# Patient Record
Sex: FEMALE | Race: BLACK OR AFRICAN AMERICAN
[De-identification: names, ages, dates, MRNs, and addresses within clinical notes are randomized per-mention and may not be internally consistent; named-entity substitution may affect disease eponyms.]

---

## 2020-10-13 VITALS — DIASTOLIC BLOOD PRESSURE: 70 MMHG | SYSTOLIC BLOOD PRESSURE: 109 MMHG

## 2021-08-14 ENCOUNTER — HOSPITAL ENCOUNTER (OUTPATIENT)
Dept: HOSPITAL 61 - 3 SO LND | Age: 31
Setting detail: OBSERVATION
Discharge: HOME | End: 2021-08-14
Attending: OBSTETRICS & GYNECOLOGY | Admitting: OBSTETRICS & GYNECOLOGY
Payer: SELF-PAY

## 2021-08-14 VITALS — HEIGHT: 66 IN | BODY MASS INDEX: 35.29 KG/M2 | WEIGHT: 219.58 LBS

## 2021-08-14 DIAGNOSIS — O36.8130: ICD-10-CM

## 2021-08-14 DIAGNOSIS — O30.003: Primary | ICD-10-CM

## 2021-08-14 DIAGNOSIS — Z3A.33: ICD-10-CM

## 2021-08-14 LAB
AMPHETAMINE/METHAMPHETAMINE: (no result)
APTT PPP: (no result) S
BACTERIA #/AREA URNS HPF: (no result) /HPF
BARBITURATES UR-MCNC: (no result) UG/ML
BENZODIAZ UR-MCNC: (no result) UG/L
BILIRUB UR QL STRIP: (no result)
CANNABINOIDS UR-MCNC: (no result) UG/L
COCAINE UR-MCNC: (no result) NG/ML
FIBRINOGEN PPP-MCNC: CLEAR MG/DL
METHADONE SERPL-MCNC: (no result) NG/ML
NITRITE UR QL STRIP: NEGATIVE
OPIATES UR-MCNC: (no result) NG/ML
PCP SERPL-MCNC: (no result) MG/DL
PH UR STRIP: 6 [PH]
PROT UR STRIP-MCNC: NEGATIVE MG/DL
RBC #/AREA URNS HPF: (no result) /HPF (ref 0–2)
UROBILINOGEN UR-MCNC: 1 MG/DL
WBC #/AREA URNS HPF: (no result) /HPF (ref 0–4)

## 2021-08-14 PROCEDURE — 80307 DRUG TEST PRSMV CHEM ANLYZR: CPT

## 2021-08-14 PROCEDURE — G0378 HOSPITAL OBSERVATION PER HR: HCPCS

## 2021-08-14 PROCEDURE — 81001 URINALYSIS AUTO W/SCOPE: CPT

## 2021-08-14 PROCEDURE — G0379 DIRECT REFER HOSPITAL OBSERV: HCPCS

## 2021-08-14 PROCEDURE — 59025 FETAL NON-STRESS TEST: CPT

## 2021-08-16 ENCOUNTER — HOSPITAL ENCOUNTER (EMERGENCY)
Dept: HOSPITAL 61 - ER | Age: 31
Discharge: HOME | End: 2021-08-16
Payer: MEDICAID

## 2021-08-16 VITALS — BODY MASS INDEX: 35.43 KG/M2 | WEIGHT: 220.46 LBS | HEIGHT: 66 IN

## 2021-08-16 VITALS — SYSTOLIC BLOOD PRESSURE: 100 MMHG | DIASTOLIC BLOOD PRESSURE: 57 MMHG

## 2021-08-16 DIAGNOSIS — Z3A.33: ICD-10-CM

## 2021-08-16 DIAGNOSIS — U07.1: ICD-10-CM

## 2021-08-16 DIAGNOSIS — R51.9: ICD-10-CM

## 2021-08-16 DIAGNOSIS — O99.333: ICD-10-CM

## 2021-08-16 DIAGNOSIS — J45.909: ICD-10-CM

## 2021-08-16 DIAGNOSIS — R53.83: ICD-10-CM

## 2021-08-16 DIAGNOSIS — Z88.5: ICD-10-CM

## 2021-08-16 DIAGNOSIS — O98.513: Primary | ICD-10-CM

## 2021-08-16 DIAGNOSIS — M79.10: ICD-10-CM

## 2021-08-16 DIAGNOSIS — O99.513: ICD-10-CM

## 2021-08-16 LAB
ALBUMIN SERPL-MCNC: 2.1 G/DL (ref 3.4–5)
ALBUMIN/GLOB SERPL: 0.4 {RATIO} (ref 1–1.7)
ALP SERPL-CCNC: 255 U/L (ref 46–116)
ALT SERPL-CCNC: 48 U/L (ref 14–59)
ANION GAP SERPL CALC-SCNC: 11 MMOL/L (ref 6–14)
APTT PPP: (no result) S
AST SERPL-CCNC: 31 U/L (ref 15–37)
BACTERIA #/AREA URNS HPF: (no result) /HPF
BASOPHILS # BLD AUTO: 0.1 X10^3/UL (ref 0–0.2)
BASOPHILS NFR BLD: 1 % (ref 0–3)
BILIRUB SERPL-MCNC: 0.3 MG/DL (ref 0.2–1)
BILIRUB UR QL STRIP: NEGATIVE
BUN SERPL-MCNC: 6 MG/DL (ref 7–20)
BUN/CREAT SERPL: 9 (ref 6–20)
CALCIUM SERPL-MCNC: 8.4 MG/DL (ref 8.5–10.1)
CHLORIDE SERPL-SCNC: 104 MMOL/L (ref 98–107)
CO2 SERPL-SCNC: 23 MMOL/L (ref 21–32)
CREAT SERPL-MCNC: 0.7 MG/DL (ref 0.6–1)
EOSINOPHIL NFR BLD: 0.1 X10^3/UL (ref 0–0.7)
EOSINOPHIL NFR BLD: 1 % (ref 0–3)
ERYTHROCYTE [DISTWIDTH] IN BLOOD BY AUTOMATED COUNT: 16.1 % (ref 11.5–14.5)
FIBRINOGEN PPP-MCNC: CLEAR MG/DL
GFR SERPLBLD BASED ON 1.73 SQ M-ARVRAT: 118.9 ML/MIN
GLUCOSE SERPL-MCNC: 95 MG/DL (ref 70–99)
HCT VFR BLD CALC: 30.4 % (ref 36–47)
HGB BLD-MCNC: 10.1 G/DL (ref 12–15.5)
LYMPHOCYTES # BLD: 1.7 X10^3/UL (ref 1–4.8)
LYMPHOCYTES NFR BLD AUTO: 17 % (ref 24–48)
MCH RBC QN AUTO: 26 PG (ref 25–35)
MCHC RBC AUTO-ENTMCNC: 33 G/DL (ref 31–37)
MCV RBC AUTO: 78 FL (ref 79–100)
MONO #: 1.5 X10^3/UL (ref 0–1.1)
MONOCYTES NFR BLD: 14 % (ref 0–9)
NEUT #: 6.8 X10^3/UL (ref 1.8–7.7)
NEUTROPHILS NFR BLD AUTO: 67 % (ref 31–73)
NITRITE UR QL STRIP: NEGATIVE
PH UR STRIP: 7.5 [PH]
PLATELET # BLD AUTO: 402 X10^3/UL (ref 140–400)
POTASSIUM SERPL-SCNC: 4.2 MMOL/L (ref 3.5–5.1)
PROT SERPL-MCNC: 7 G/DL (ref 6.4–8.2)
PROT UR STRIP-MCNC: NEGATIVE MG/DL
RBC # BLD AUTO: 3.89 X10^6/UL (ref 3.5–5.4)
RBC #/AREA URNS HPF: 0 /HPF (ref 0–2)
SODIUM SERPL-SCNC: 138 MMOL/L (ref 136–145)
UROBILINOGEN UR-MCNC: 1 MG/DL
WBC # BLD AUTO: 10.1 X10^3/UL (ref 4–11)
WBC #/AREA URNS HPF: (no result) /HPF (ref 0–4)

## 2021-08-16 PROCEDURE — 80053 COMPREHEN METABOLIC PANEL: CPT

## 2021-08-16 PROCEDURE — 87426 SARSCOV CORONAVIRUS AG IA: CPT

## 2021-08-16 PROCEDURE — 96360 HYDRATION IV INFUSION INIT: CPT

## 2021-08-16 PROCEDURE — 36415 COLL VENOUS BLD VENIPUNCTURE: CPT

## 2021-08-16 PROCEDURE — 87086 URINE CULTURE/COLONY COUNT: CPT

## 2021-08-16 PROCEDURE — 99283 EMERGENCY DEPT VISIT LOW MDM: CPT

## 2021-08-16 PROCEDURE — 81001 URINALYSIS AUTO W/SCOPE: CPT

## 2021-08-16 PROCEDURE — 85025 COMPLETE CBC W/AUTO DIFF WBC: CPT

## 2021-08-16 NOTE — PHYS DOC
Past Medical History


Past Medical History:  No Pertinent History, Asthma, Other


Additional Past Medical Histor:  ectopic


 (WALDEMAR ROJAS APRN)


Past Surgical History:  Tonsillectomy


Additional Past Surgical Histo:  ectopic pregnancy


 (WALDEMAR ROJAS APRN)


Smoking Status:  Current Every Day Smoker


Alcohol Use:  None


Drug Use:  Marijuana


 (WALDEMAR ROJAS APRN)





General Adult


EDM:


Chief Complaint:  FLU SYMPTOM





HPI:


HPI:





Patient is a 31  year old female who presents with yesterday began having hea

dache, body aches, shortness of breath and cough.  She is 33 weeks pregnant and 

has not had any OB care.  She has a history of smoking, ectopic pregnancy and 

asthma.  She denies chest pain, dizziness, syncope, abdominal pain, vaginal 

bleeding, abnormal vaginal discharge, back pain, diarrhea, urinary symptoms.  

She rates her overall discomfort a 7 out of 10.


 (WALDEMAR ROJAS APRN)





Review of Systems:


Review of Systems:


Constitutional:   Denies fever or +chills. []


Eyes:   Denies change in visual acuity. []


HENT:   Denies nasal congestion or sore throat. [] 


Respiratory: + cough or +shortness of breath. [] 


Cardiovascular:   Denies chest pain or edema. [] 


GI:   Denies abdominal pain, nausea, vomiting, bloody stools or diarrhea. [] 


:  Denies dysuria. [] 


Musculoskeletal:   Denies back pain or joint pain. + Generalized body aches [] 


Integument:   Denies rash. [] 


Neurologic:   +headache, denies focal weakness or sensory changes. [] 


Endocrine:   Denies polyuria or polydipsia. [] 


Lymphatic:  Denies swollen glands. [] 


Psychiatric:  Denies depression or anxiety. []


 (WALDEMAR ROJAS APRN)





Heart Score:


C/O Chest Pain:  No


Risk Factors:


Risk Factors:  DM, Current or recent (<one month) smoker, HTN, HLP, family 

history of CAD, obesity.


Risk Scores:


Score 0 - 3:  2.5% MACE over next 6 weeks - Discharge Home


Score 4 - 6:  20.3% MACE over next 6 weeks - Admit for Clinical Observation


Score 7 - 10:  72.7% MACE over next 6 weeks - Early Invasive Strategies


 (WALDEMAR ROJAS APRN)





Allergies:


Allergies:





Allergies








Coded Allergies Type Severity Reaction Last Updated Verified


 


  morphine Allergy Intermediate  8/14/21 Yes








 (WALDEMAR ROJAS)





Physical Exam:


PE:





Constitutional: Well developed, well nourished, no acute distress, non-toxic 

appearance. []


HENT: Normocephalic, atraumatic, bilateral external ears normal, oropharynx 

moist, no oral exudates, nose normal. []


Eyes: PERRLA, EOMI, conjunctiva normal, no discharge. [] 


Neck: Normal range of motion, no tenderness, supple, no stridor. [] 


Cardiovascular:Heart rate regular rhythm, no murmur []


Lungs & Thorax:  Bilateral upper breath sounds clear lower diminished to 

auscultation []


Abdomen: Bowel sounds normal, soft, no tenderness, no masses, no pulsatile 

masses. [] 


Skin: Warm, dry, no erythema, no rash. [] 


Back: No tenderness, no CVA tenderness. [] 


Extremities: No tenderness, no cyanosis, no clubbing, ROM intact, no edema. [] 


Neurologic: Alert and oriented X 3, normal motor function, normal sensory 

function, no focal deficits noted. []


Psychologic: Affect normal, judgement normal, mood normal. []


 (WALDEMAR ROJAS)





Current Patient Data:


Vital Signs:





                                   Vital Signs








  Date Time  Temp Pulse Resp B/P (MAP) Pulse Ox O2 Delivery O2 Flow Rate FiO2


 


8/16/21 14:13 98.3 117 20 108/67 (83) 99 Room Air  





 98.3       








 (WALDEMAR ROJAS)





EKG:


EKG:


[]


 (WALDEMAR ROJAS)





Radiology/Procedures:


Radiology/Procedures:


[]


 (WALDEMAR ROJAS)





Course & Med Decision Making:


Course & Med Decision Making


Pertinent Labs and Imaging studies reviewed. (See chart for details)





COVID-19 CRITERIA:    The patient was evaluated during the global COVID-19 pan

demic, and that diagnosis was suspected/considered upon their initial 

presentation.  Their evaluation, treatment and testing was consistent with 

current guidelines for patients who present with complaints or symptoms that may

 be related to COVID-19.





See HPI.  Alert and oriented x4.  Ambulatory steady gait.  Speaks in full clear 

sentences.  Lungs are clear in upper lobes diminished in lower lobes.  Vital 

signs within normal limits.  Afebrile.  She states she is not taking any kind of

 medication to help her symptoms started yesterday.  Skin pink warm and dry. 

Covid positive. OB is down to monitor fetal heart tones. Patient has tolerated 

PO intake and ate a meal. She recieved a 1 liter bolus in the ED. She will be 

sent home on Keflex and a pro air inhaler incase she needs it. Patient is not 

requiring oxygen. 





1900: Waiting for OB to come down and monitor fetal heart tones in the patient 

and then she will be discharged.  Patient is signed over to Dr. Landers.





[]


 (WALDEMAR ROJAS)


Course & Med Decision Making


patient seen by MAYA, agree with starting workup. I ended my shift at 1800 and 

thus was not present for fetal heart tones or end of encounter.





Maki Barton DO


 (MAKI BARTON DO)


Misty Disclaimer:


Dragon Disclaimer:


This electronic medical record was generated, in whole or in part, using a voice

 recognition dictation system.


 (WALDEMAR ROJAS)





COVID-19 Patient Risks:


Age 65 or older:  No


Sign of co-morbidity:  Yes


Exp to person + for COVID:  No


Exp to PUI:  No


Travel from affected area:  No


Lower respiratory symptoms:  Yes


Fever:  Yes


Other:  Yes (body aches)


 (WALDEMAR ROJAS)





PPE Use:


Full PPE with N95 mask or PAPR:  Yes


 (WALDEMAR ROJAS)





Departure


Departure


Impression:  


   Primary Impression:  


   COVID-19


   Additional Impressions:  


   Cough


   Fatigue


   Qualified Codes:  R53.83 - Other fatigue


   Body aches


Disposition:  01 HOME / SELF CARE / HOMELESS


Condition:  STABLE


Referrals:  


NO PCP (PCP)


Patient Instructions:  ABCs of Pregnancy, Cough, Adult, Fatigue





Additional Instructions:  


Follow-up with an OB doctor as appropriate.  If you begin having severe 

shortness of breath or chest pain or abdominal pain or vaginal bleeding to 

return to the emergency room.  Return emergency room if you cannot keep down any

 kind of fluids.  Take Tylenol for any kind of pain you may have or fever.  

Remember to drink plenty of water to stay hydrated.


Scripts


Cephalexin (CEPHALEXIN) 500 Mg Capsule


1 CAP PO TID, #21 CAP


   Prov: WALDEMAR ROJAS         8/16/21 


Albuterol Sulfate (PROAIR HFA INHALER) 8.5 Gm Hfa.aer.ad


1 PUFF INH PRN Q6HRS PRN for SHORTNESS OF BREATH, #1 EACH 0 Refills


   Prov: WALDEMAR ROJAS         8/16/21











WALDEMAR ROJAS            Aug 16, 2021 15:41


MAKI BARTON DO             Aug 17, 2021 17:06

## 2021-08-16 NOTE — NUR
Reactive NST baby A and baby B 33 week twins, heart tones done in ER per ER request, no 
contractions

## 2022-04-25 ENCOUNTER — HOSPITAL ENCOUNTER (EMERGENCY)
Dept: HOSPITAL 61 - ER | Age: 32
Discharge: HOME | End: 2022-04-25
Payer: MEDICAID

## 2022-04-25 VITALS — BODY MASS INDEX: 27.1 KG/M2 | WEIGHT: 168.65 LBS | HEIGHT: 66 IN

## 2022-04-25 VITALS — SYSTOLIC BLOOD PRESSURE: 128 MMHG | DIASTOLIC BLOOD PRESSURE: 83 MMHG

## 2022-04-25 DIAGNOSIS — F17.200: ICD-10-CM
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
## 2022-04-25 DIAGNOSIS — Z88.5: ICD-10-CM

## 2022-04-25 DIAGNOSIS — S60.221A: ICD-10-CM
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
## 2022-04-25 DIAGNOSIS — S01.01XA: Primary | ICD-10-CM

## 2022-04-25 DIAGNOSIS — J45.909: ICD-10-CM

## 2022-04-25 PROCEDURE — 12001 RPR S/N/AX/GEN/TRNK 2.5CM/<: CPT

## 2022-04-25 PROCEDURE — 99284 EMERGENCY DEPT VISIT MOD MDM: CPT

## 2022-04-25 PROCEDURE — 70450 CT HEAD/BRAIN W/O DYE: CPT

## 2022-04-25 PROCEDURE — 73130 X-RAY EXAM OF HAND: CPT
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
